# Patient Record
Sex: FEMALE
[De-identification: names, ages, dates, MRNs, and addresses within clinical notes are randomized per-mention and may not be internally consistent; named-entity substitution may affect disease eponyms.]

---

## 2022-06-21 ENCOUNTER — TELEPHONE (OUTPATIENT)
Dept: SCHEDULING | Age: 22
End: 2022-06-21

## 2023-06-12 ENCOUNTER — TELEPHONE (OUTPATIENT)
Dept: OTOLARYNGOLOGY | Age: 23
End: 2023-06-12

## 2023-06-16 ENCOUNTER — EXTERNAL RECORD (OUTPATIENT)
Dept: OTHER | Age: 23
End: 2023-06-16

## 2023-06-16 LAB
D-DIMER, QUANTITATIVE: 936 NG/MLFEU (ref 0–622)
LDH: 359 U/L (ref 140–271)

## 2023-06-17 ENCOUNTER — EXTERNAL RECORD (OUTPATIENT)
Dept: OTHER | Facility: PHYSICIAN GROUP | Age: 23
End: 2023-06-17

## 2023-06-17 LAB
*MEAN CORPUSCULAR HGB CONC: 33.3 G/DL (ref 32.5–35.8)
ANION GAP: 8 MEQ/L (ref 6.2–14.7)
BASOPHIL AUTOMATED: 0.6 %
BASOPHILS: 0 (ref 0–0.14)
BLOOD UREA NITROGEN (BUN): 19 MG/DL (ref 7–25)
BUN/CREATININE RATIO: 20.7 (ref 7.4–23)
CALCIUM, SERUM: 9 MG/DL (ref 8.6–10.3)
CARBON DIOXIDE: 24 MEQ/L (ref 21–31)
CHLORIDE, SERUM: 104 MMOL/L (ref 98–107)
CREATININE: 0.92 MG/DL (ref 0.6–1.2)
EOSINOPHIL AUTOMATED: 2.4 %
EOSINOPHILS: 0.1 (ref 0–0.6)
EST GLOMERULAR FILTRATION RATE: > 60 ML/MIN
GLUCOSE: 68 MG/DL (ref 70–99)
HEMATOCRIT: 39.4 % (ref 34.7–45.1)
HEMOGLOBIN: 13.1 GM/DL (ref 12–15.3)
K (POTASSIUM, SERUM): 4 MMOL/L (ref 3.5–5.2)
LYMPHOCYTE AUTOMATED: 48.5 %
LYMPHOCYTES: 1.2 (ref 0.78–3.73)
MEAN CORPUSCULAR HGB: 28.5 PG (ref 26–34)
MEAN CORPUSCULAR VOL: 85.5 FL (ref 80–100)
MEAN PLATELET VOLUME: 7.2 FL (ref 6.8–10.2)
MONOCYTE AUTOMATED: 8.3 %
MONOCYTES: 0.2 (ref 0.17–1)
NA (SODIUM, SERUM): 136 MMOL/L (ref 133–144)
NEUTROPHIL ABSOLUTE: 1 (ref 1.91–7.6)
NEUTROPHIL AUTOMATED: 40.2 %
PLATELET COUNT: 190 K/MM3 (ref 150–450)
RED BLOOD CELL COUNT: 4.61 M/MM3 (ref 3.63–5.04)
RED CELL DISTRIBUTIO: 12.6 % (ref 11.9–15.9)
WHITE BLOOD CELL COU: 2.4 K/MM3 (ref 4–11)

## 2023-06-17 PROCEDURE — 99254 IP/OBS CNSLTJ NEW/EST MOD 60: CPT | Performed by: OTOLARYNGOLOGY

## 2023-06-18 LAB
*MEAN CORPUSCULAR HGB CONC: 33.9 G/DL (ref 32.5–35.8)
ALBUMIN, SERUM (ALB): 3 G/DL (ref 3.5–5.7)
ANION GAP: 9 MEQ/L (ref 6.2–14.7)
BASOPHIL AUTOMATED: 0.6 %
BASOPHILS: 0 (ref 0–0.14)
BLOOD UREA NITROGEN (BUN): 15 MG/DL (ref 7–25)
BUN/CREATININE RATIO: 15.3 (ref 7.4–23)
CALCIUM ALBUMIN ADJUSTED: 9.3 MG/DL (ref 8.6–10.3)
CALCIUM, SERUM: 8.5 MG/DL (ref 8.6–10.3)
CARBON DIOXIDE: 23 MEQ/L (ref 21–31)
CHLORIDE, SERUM: 108 MMOL/L (ref 98–107)
CREATININE: 0.98 MG/DL (ref 0.6–1.2)
EOSINOPHIL AUTOMATED: 1.6 %
EOSINOPHILS: 0 (ref 0–0.6)
EST GLOMERULAR FILTRATION RATE: > 60 ML/MIN
GLUCOSE: 71 MG/DL (ref 70–99)
HEMATOCRIT: 34 % (ref 34.7–45.1)
HEMOGLOBIN: 11.5 GM/DL (ref 12–15.3)
K (POTASSIUM, SERUM): 4.1 MMOL/L (ref 3.5–5.2)
LYMPHOCYTE AUTOMATED: 45.6 %
LYMPHOCYTES: 1.4 (ref 0.78–3.73)
MEAN CORPUSCULAR HGB: 28.7 PG (ref 26–34)
MEAN CORPUSCULAR VOL: 84.9 FL (ref 80–100)
MEAN PLATELET VOLUME: 7.5 FL (ref 6.8–10.2)
MONOCYTE AUTOMATED: 8 %
MONOCYTES: 0.2 (ref 0.17–1)
NA (SODIUM, SERUM): 140 MMOL/L (ref 133–144)
NEUTROPHIL ABSOLUTE: 1.3 (ref 1.91–7.6)
NEUTROPHIL AUTOMATED: 44.2 %
PLATELET COUNT: 160 K/MM3 (ref 150–450)
RED BLOOD CELL COUNT: 4.01 M/MM3 (ref 3.63–5.04)
RED CELL DISTRIBUTIO: 12.4 % (ref 11.9–15.9)
WHITE BLOOD CELL COU: 3 K/MM3 (ref 4–11)

## 2023-06-18 PROCEDURE — 99231 SBSQ HOSP IP/OBS SF/LOW 25: CPT | Performed by: OTOLARYNGOLOGY

## 2023-06-19 ENCOUNTER — EXTERNAL LAB (OUTPATIENT)
Dept: OTHER | Facility: PHYSICIAN GROUP | Age: 23
End: 2023-06-19

## 2023-06-19 ENCOUNTER — EXTERNAL LAB (OUTPATIENT)
Dept: OTHER | Age: 23
End: 2023-06-19

## 2023-06-19 LAB
INTERNATIONAL NORMAL: 1 (ref 0.8–1.1)
LAB RESULT: NORMAL
PROTHROMBIN TIME (PATIENT): 12 SECONDS (ref 10.1–13.1)

## 2023-06-20 ENCOUNTER — EXTERNAL RECORD (OUTPATIENT)
Dept: OTHER | Age: 23
End: 2023-06-20

## 2023-06-20 LAB
*MEAN CORPUSCULAR HGB CONC: 34 G/DL (ref 32.5–35.8)
ANION GAP: 7 MEQ/L (ref 6.2–14.7)
BASOPHIL AUTOMATED: 0.6 %
BASOPHILS: 0 (ref 0–0.14)
BLOOD UREA NITROGEN (BUN): 13 MG/DL (ref 7–25)
BUN/CREATININE RATIO: 15.5 (ref 7.4–23)
CALCIUM, SERUM: 8.7 MG/DL (ref 8.6–10.3)
CARBON DIOXIDE: 24 MEQ/L (ref 21–31)
CHLORIDE, SERUM: 105 MMOL/L (ref 98–107)
CREATININE: 0.84 MG/DL (ref 0.6–1.2)
EOSINOPHIL AUTOMATED: 2.5 %
EOSINOPHILS: 0.1 (ref 0–0.6)
EST GLOMERULAR FILTRATION RATE: > 60 ML/MIN
GLUCOSE: 96 MG/DL (ref 70–99)
HEMATOCRIT: 33.3 % (ref 34.7–45.1)
HEMOGLOBIN: 11.3 GM/DL (ref 12–15.3)
K (POTASSIUM, SERUM): 3.7 MMOL/L (ref 3.5–5.2)
LYMPHOCYTE AUTOMATED: 46 %
LYMPHOCYTES: 1.2 (ref 0.78–3.73)
MEAN CORPUSCULAR HGB: 28.4 PG (ref 26–34)
MEAN CORPUSCULAR VOL: 83.4 FL (ref 80–100)
MEAN PLATELET VOLUME: 7.4 FL (ref 6.8–10.2)
MG (MAGNESIUM, SERUM: 1.7 MG/DL (ref 1.6–2.6)
MONOCYTE AUTOMATED: 7.2 %
MONOCYTES: 0.2 (ref 0.17–1)
NA (SODIUM, SERUM): 136 MMOL/L (ref 133–144)
NEUTROPHIL ABSOLUTE: 1.1 (ref 1.91–7.6)
NEUTROPHIL AUTOMATED: 43.7 %
PLATELET COUNT: 172 K/MM3 (ref 150–450)
RED BLOOD CELL COUNT: 4 M/MM3 (ref 3.63–5.04)
RED CELL DISTRIBUTIO: 12.5 % (ref 11.9–15.9)
WHITE BLOOD CELL COU: 2.6 K/MM3 (ref 4–11)

## 2023-06-26 ENCOUNTER — TELEPHONE (OUTPATIENT)
Dept: OTOLARYNGOLOGY | Age: 23
End: 2023-06-26

## 2023-06-26 LAB — CHROMOSOME ANAL., BONE MARROW: NORMAL

## 2023-07-12 ENCOUNTER — OFFICE VISIT (OUTPATIENT)
Dept: OTOLARYNGOLOGY | Age: 23
End: 2023-07-12

## 2023-07-12 VITALS — BODY MASS INDEX: 24.74 KG/M2 | WEIGHT: 126 LBS | HEIGHT: 60 IN

## 2023-07-12 DIAGNOSIS — K11.1 PAROTID GLAND ENLARGEMENT: ICD-10-CM

## 2023-07-12 DIAGNOSIS — R59.0 CERVICAL LYMPHADENOPATHY: Primary | ICD-10-CM

## 2023-07-12 PROBLEM — R74.8 ELEVATED LIVER ENZYMES: Status: ACTIVE | Noted: 2023-07-10

## 2023-07-12 PROBLEM — F98.8 ADD (ATTENTION DEFICIT DISORDER): Status: ACTIVE | Noted: 2023-07-12

## 2023-07-12 PROBLEM — D72.819 LEUKOPENIA: Status: ACTIVE | Noted: 2023-01-01

## 2023-07-12 PROCEDURE — 99214 OFFICE O/P EST MOD 30 MIN: CPT | Performed by: OTOLARYNGOLOGY

## 2023-07-12 RX ORDER — DEXTROAMPHETAMINE SACCHARATE, AMPHETAMINE ASPARTATE MONOHYDRATE, DEXTROAMPHETAMINE SULFATE AND AMPHETAMINE SULFATE 5; 5; 5; 5 MG/1; MG/1; MG/1; MG/1
CAPSULE, EXTENDED RELEASE ORAL
COMMUNITY
Start: 2023-05-12

## 2023-07-12 RX ORDER — LANOLIN ALCOHOL/MO/W.PET/CERES
3 CREAM (GRAM) TOPICAL DAILY
COMMUNITY

## 2023-07-20 ENCOUNTER — TELEPHONE (OUTPATIENT)
Dept: OTOLARYNGOLOGY | Age: 23
End: 2023-07-20

## 2024-02-16 ENCOUNTER — HOSPITAL ENCOUNTER (OUTPATIENT)
Age: 24
Discharge: HOME | End: 2024-02-16
Payer: COMMERCIAL

## 2024-02-16 VITALS
HEART RATE: 107 BPM | SYSTOLIC BLOOD PRESSURE: 110 MMHG | TEMPERATURE: 99 F | RESPIRATION RATE: 19 BRPM | DIASTOLIC BLOOD PRESSURE: 88 MMHG

## 2024-02-16 DIAGNOSIS — U07.1 COVID-19: Primary | ICD-10-CM

## 2024-02-16 LAB
POCT INFLUENZA A: NEGATIVE
POCT INFLUENZA B: NEGATIVE
S PYO AG THROAT QL IA.RAPID: NEGATIVE
SARS-COV-2 RNA RESP QL NAA+PROBE: DETECTED

## 2024-02-16 PROCEDURE — 87651 STREP A DNA AMP PROBE: CPT | Performed by: PHYSICIAN ASSISTANT

## 2024-02-16 PROCEDURE — 87502 INFLUENZA DNA AMP PROBE: CPT | Performed by: PHYSICIAN ASSISTANT

## 2024-02-16 PROCEDURE — 99203 OFFICE O/P NEW LOW 30 MIN: CPT

## 2024-02-16 PROCEDURE — 99202 OFFICE O/P NEW SF 15 MIN: CPT

## 2024-02-16 NOTE — ED PROVIDER NOTES
Chief Complaint   Patient presents with    Sore Throat           Ear Pain       HPI:     Beverley Samaniego is a 23 year old female who presents for evaluation of sore throat over the last 2 weeks.  [facility] with a negative strep screen, discharged home with recommendation for supportive measures.  Notes developed low-grade temperature overnight without preceding temperature, took Advil Cold and Sinus this morning around 7 AM, afebrile on arrival.  Denies sick contacts or exposures.  Denies history of recent antibiotic or previous mono.  Denies associated headache dizziness active ear pain dysphagia nasal congestion cough neck pain chest pain shortness of breath abdominal pain vomiting diarrhea dysuria or rash.      PFSH    PFSH asessment screens reviewed and agree.  Nurses notes reviewed I agree with documentation.    No family history on file.  Family history reviewed with patient/caregiver and is not pertinent to presenting problem.  Social History     Socioeconomic History    Marital status: Single     Spouse name: Not on file    Number of children: Not on file    Years of education: Not on file    Highest education level: Not on file   Occupational History    Not on file   Tobacco Use    Smoking status: Not on file    Smokeless tobacco: Not on file   Substance and Sexual Activity    Alcohol use: Not on file    Drug use: Not on file    Sexual activity: Not on file   Other Topics Concern    Not on file   Social History Narrative    Not on file     Social Determinants of Health     Financial Resource Strain: Not on file   Food Insecurity: Not on file   Transportation Needs: Not on file   Physical Activity: Not on file   Stress: Not on file   Social Connections: Not on file   Housing Stability: Not on file         ROS:   Positive for stated complaint: Sore throat.  Fever.  All other systems reviewed and negative except as noted above.  Constitutional and Vital Signs  Reviewed.      Physical Exam:     Findings:    /88   Pulse 107   Temp 99.1 °F (37.3 °C) (Temporal)   Resp 19   GENERAL: well developed, well nourished, well hydrated, no distress  SKIN: good skin turgor, no obvious rashes  NECK: No nuchal rigidity.  No palpable cervical lymphadenopathy.  Supple, no adenopathy  EXTREMITIES: no cyanosis or edema. MATTHEWS without difficulty  GI: No palpable organomegaly left upper quadrant.  Soft, non-tender, normal bowel sounds  HEAD: normocephalic, atraumatic  EYES: sclera non icteric bilateral, conjunctiva clear  EARS: TMs clear bilaterally. Canals clear.  NOSE: no rhinorrhea..  MMM.  Nasal turbinates: pink, normal mucosa  THROAT: Mild reactive changes along the posterior pharynx, nonenlarged tonsils., without exudates, uvula midline, and airway patent  LUNGS: clear to auscultation bilaterally; no rales, rhonchi, or wheezes  NEURO: No focal deficits  PSYCH: Alert and oriented x3.  Answering questions appropriately.  Mood appropriate.    MDM/Assessment/Plan:   Orders for this encounter:    Orders Placed This Encounter    POCT Flu Test     Order Specific Question:   Release to patient     Answer:   Immediate    Rapid Strep A - ID NOW     Order Specific Question:   Release to patient     Answer:   Immediate    Rapid SARS-CoV-2 by PCR     Order Specific Question:   Release to patient     Answer:   Immediate       Labs performed this visit:  Recent Results (from the past 10 hour(s))   POCT Flu Test    Collection Time: 02/16/24  1:15 PM    Specimen: Nares; Other   Result Value Ref Range    POCT INFLUENZA A Negative Negative    POCT INFLUENZA B Negative Negative   Rapid Strep A - ID NOW    Collection Time: 02/16/24  1:15 PM    Specimen: Throat; Other   Result Value Ref Range    Strep A by PCR Negative Negative   Rapid SARS-CoV-2 by PCR    Collection Time: 02/16/24  1:15 PM    Specimen: Nares; Other   Result Value Ref Range    Rapid SARS-CoV-2 by PCR Detected (A) Not Detected        MDM:  Patient coronavirus positive, strep and flu negative.  Discussed monoscreen based on history and extent of symptoms, patient declines blood draw states will readdress as outpatient.  Happy with plan of care, alert nontoxic-appearing.  Agrees with over-the-counter supportive agents including analgesics for pain and fever.  Agrees with no antiviral treatment based on history and examination.    Diagnosis:    ICD-10-CM    1. COVID-19  U07.1           All results reviewed and discussed with patient.  See AVS for detailed discharge instructions for your condition today.    Follow Up with:  Tanya Torrez  [address]  [address] FLO[address]  [facility] [address]  [phone]    Schedule an appointment as soon as possible for a visit in 3 days  As needed, If symptoms worsen

## 2024-02-16 NOTE — ED INITIAL ASSESSMENT (HPI)
Patient with sore throat and ear pain that started 2 weeks ago, at that time patient negative for strep throat   Patient taking sinus medication with some relief. Last night symptoms became worse and she developed fever of 101

## 2024-04-05 ENCOUNTER — HOSPITAL ENCOUNTER (OUTPATIENT)
Age: 24
Discharge: HOME | End: 2024-04-05
Attending: EMERGENCY MEDICINE
Payer: COMMERCIAL

## 2024-04-05 VITALS
TEMPERATURE: 99 F | DIASTOLIC BLOOD PRESSURE: 77 MMHG | HEART RATE: 99 BPM | SYSTOLIC BLOOD PRESSURE: 104 MMHG | OXYGEN SATURATION: 96 % | RESPIRATION RATE: 20 BRPM

## 2024-04-05 DIAGNOSIS — J03.90 ACUTE TONSILLITIS, UNSPECIFIED ETIOLOGY: Primary | ICD-10-CM

## 2024-04-05 LAB
POCT MONO: NEGATIVE
S PYO AG THROAT QL IA.RAPID: NEGATIVE

## 2024-04-05 PROCEDURE — 99213 OFFICE O/P EST LOW 20 MIN: CPT

## 2024-04-05 PROCEDURE — 86308 HETEROPHILE ANTIBODY SCREEN: CPT | Performed by: EMERGENCY MEDICINE

## 2024-04-05 PROCEDURE — 87651 STREP A DNA AMP PROBE: CPT | Performed by: EMERGENCY MEDICINE

## 2024-04-05 PROCEDURE — 36415 COLL VENOUS BLD VENIPUNCTURE: CPT

## 2024-04-05 RX ORDER — DEXTROAMPHETAMINE SACCHARATE, AMPHETAMINE ASPARTATE MONOHYDRATE, DEXTROAMPHETAMINE SULFATE AND AMPHETAMINE SULFATE 2.5; 2.5; 2.5; 2.5 MG/1; MG/1; MG/1; MG/1
CAPSULE, EXTENDED RELEASE ORAL
COMMUNITY
Start: 2021-09-20

## 2024-04-05 NOTE — ED PROVIDER NOTES
Patient Seen in: [facility]      History     Chief Complaint   Patient presents with    Tonsil Problem     Stated Complaint: swollen tonsils    Subjective:   HPI    Patient is a 23-year-old female with no significant past medical history who presents now with recurrent swollen tonsils.  The patient states that she had COVID approximately 6 weeks ago.  Since that time, the patient has had intermittent, but frequently recurring swollen tonsils.  Patient states that this has happened multiple times in the last 6 weeks.  The patient states she has had increased swelling of tonsils over the last several days.  Patient denies any fever.  Denies any abdominal pain    Objective:   History reviewed. No pertinent past medical history.           History reviewed. No pertinent surgical history.             No pertinent social history.            Review of Systems    Positive for stated complaint: swollen tonsils  Other systems are as noted in HPI.  Constitutional and vital signs reviewed.      All other systems reviewed and negative except as noted above.    Physical Exam     ED Triage Vitals [04/05/24 1228]   /77   Pulse 99   Resp 20   Temp 98.7 °F (37.1 °C)   Temp src Temporal   SpO2 96 %   O2 Device None (Room air)       Current:/77   Pulse 99   Temp 98.7 °F (37.1 °C) (Temporal)   Resp 20   SpO2 96%         Physical Exam    Constitutional: Well-developed well-nourished in no acute distress  Head: Normocephalic, no swelling or tenderness  Neck: No significant cervical adenopathy.  Eyes: Nonicteric sclera, no conjunctival injection  ENT: TMs are clear and flat bilaterally.  There is mild posterior pharyngeal erythema with moderate symmetric bilateral tonsillar hypertrophy.  The uvula is midline.  Chest: Clear to auscultation, no tenderness  Cardiovascular: Regular rate and rhythm without murmur  Abdomen: Soft, nontender and nondistended  Neurologic: Patient is awake, alert and oriented ×3.  The  patient's motor strength is 5 out of 5 and symmetric in the upper and lower extremities bilaterally  Extremities: No focal swelling or tenderness  Skin: No pallor, no redness or warmth to the touch      ED Course     Labs Reviewed   POCT MONO TEST - Normal   RAPID STREP A - Normal             Patient's negative strep, negative mono were reviewed with the patient.  Options regarding antibiotics and/or steroids were discussed.  The patient states she has been on multiple antibiotics which have not changed her symptomatology.  She declines any further antibiotics at this time.  Patient states she was on a steroid taper a few months ago that resulted in a diffuse rash.  The patient declined steroids.  Will provide with ENT follow-up.         MDM      Viral pharyngitis versus strep throat versus mononucleosis                                   Medical Decision Making      Disposition and Plan     Clinical Impression:  1. Acute tonsillitis, unspecified etiology         Disposition:  Discharge  4/5/2024 12:51 pm    Follow-up:  Sarha Caicedo MD  [address] [place] [id number]  [phone]      4-year ear nose and throat doctor          Medications Prescribed:  Discharge Medication List as of 4/5/2024 12:51 PM

## 2024-04-05 NOTE — ED INITIAL ASSESSMENT (HPI)
Patient reports hx of covid infection in February, since then reports increased size of her tonsils with symptoms worsening over the last few weeks.  Denies fevers, chills.  Reports occasional barky cough.  Reports multiple negative strep tests.

## 2024-04-09 ENCOUNTER — OFFICE VISIT (OUTPATIENT)
Dept: OTOLARYNGOLOGY | Facility: CLINIC | Age: 24
End: 2024-04-09

## 2024-04-09 VITALS — HEIGHT: 60 IN | WEIGHT: 115 LBS | TEMPERATURE: 98 F | BODY MASS INDEX: 22.58 KG/M2

## 2024-04-09 DIAGNOSIS — J03.90 TONSILLITIS: Primary | ICD-10-CM

## 2024-04-09 PROCEDURE — 99243 OFF/OP CNSLTJ NEW/EST LOW 30: CPT | Performed by: STUDENT IN AN ORGANIZED HEALTH CARE EDUCATION/TRAINING PROGRAM

## 2024-04-09 PROCEDURE — 3008F BODY MASS INDEX DOCD: CPT | Performed by: STUDENT IN AN ORGANIZED HEALTH CARE EDUCATION/TRAINING PROGRAM

## 2024-04-09 RX ORDER — MULTIVITAMIN
1 TABLET ORAL DAILY
COMMUNITY

## 2024-04-09 RX ORDER — CLINDAMYCIN HYDROCHLORIDE 300 MG/1
300 CAPSULE ORAL 3 TIMES DAILY
Qty: 21 CAPSULE | Refills: 0 | Status: SHIPPED | OUTPATIENT
Start: 2024-04-09 | End: 2024-04-16

## 2024-04-09 NOTE — PROGRESS NOTES
Beverley Samaniego is a 23 year old female.   Chief Complaint   Patient presents with    Tonsillitis     Recurrent tonsil infection, enlarged tonsil, orange/white patches on tongue , strep negative, Covid positive in February       Snoring       ASSESSMENT AND PLAN:   1. Tonsillitis  Is a 23-year-old who reports tonsillary pain and enlargement for about a week.  Was seen in the urgent care April for this.  She has not been on any antibiotics or steroids.  She states that she had a rash with a steroid last summer.  She has been on several rounds of antibiotics she states for a vaginal strep infection.    Exam she has erythematous and inflamed appearing tonsils on each side.  No signs of peritonsillar abscess    Does appear that she has an acute tonsillitis not currently on any treatment.  Has been going on for at least 4 to 5 days now and has not been improving.  We will begin clindamycin for 7 days.  Will avoid steroids due to previous side effects of a rash.  Currently no signs of peritonsillar abscess but discussed return precautions.  She can follow back up if still not improving. Consult from Dr Lennon regarding tonsillitis     The patient indicates understanding of these issues and agrees to the plan.      EXAM:   Temp 97.8 °F (36.6 °C) (Tympanic)   Ht 5' (1.524 m)   Wt 115 lb (52.2 kg)   BMI 22.46 kg/m²     Pertinent exam findings may also be noted above in assessment and plan     System Details   Skin Inspection - Normal.   Constitutional Overall appearance - Normal.   Head/Face Symmetric, TMJ tenderness not present    Eyes EOMI, PERRL   Right ear:  Canal clear, TM intact, no DANNA   Left ear:  Canal clear, TM intact, no DANNA   Nose: Septum midline, inferior turbinates not enlarged, nasal valves without collapse    Oral cavity/Oropharynx: No lesions or masses on inspection or palpation, tonsils symmetric    Neck: Soft without LAD, thyroid not enlarged  Voice clear/ no stridor   Other:      Scopes and Procedures:              Current Outpatient Medications   Medication Sig Dispense Refill    Multiple Vitamin (ONE-DAILY MULTI VITAMINS) Oral Tab Take 1 tablet by mouth daily.      clindamycin 300 MG Oral Cap Take 1 capsule (300 mg total) by mouth 3 (three) times daily for 7 days. 21 capsule 0    amphetamine-dextroamphetamine ER 10 MG Oral Capsule SR 24 Hr TAKE 1 CAPSULE BY MOUTH EVERY MORNING AS NEEDED FOR ADHD SYMPTOMS        History reviewed. No pertinent past medical history.   Social History:  Social History     Socioeconomic History    Marital status: Single          Andrae Quan MD  4/9/2024  6:10 PM

## 2024-05-28 ENCOUNTER — TELEPHONE (OUTPATIENT)
Dept: ORTHOPEDICS | Age: 24
End: 2024-05-28

## 2024-06-06 ENCOUNTER — TELEPHONE (OUTPATIENT)
Dept: SPORTS MEDICINE | Age: 24
End: 2024-06-06

## 2024-06-06 ENCOUNTER — IMAGING SERVICES (OUTPATIENT)
Dept: GENERAL RADIOLOGY | Age: 24
End: 2024-06-06
Attending: FAMILY MEDICINE

## 2024-06-06 ENCOUNTER — APPOINTMENT (OUTPATIENT)
Dept: SPORTS MEDICINE | Age: 24
End: 2024-06-06

## 2024-06-06 VITALS
DIASTOLIC BLOOD PRESSURE: 77 MMHG | WEIGHT: 113 LBS | BODY MASS INDEX: 22.19 KG/M2 | HEIGHT: 60 IN | HEART RATE: 81 BPM | SYSTOLIC BLOOD PRESSURE: 109 MMHG

## 2024-06-06 DIAGNOSIS — S63.501A SPRAIN OF RIGHT WRIST, INITIAL ENCOUNTER: Primary | ICD-10-CM

## 2024-06-06 DIAGNOSIS — M25.531 RIGHT WRIST PAIN: ICD-10-CM

## 2024-06-06 RX ORDER — MELOXICAM 7.5 MG/1
7.5 TABLET ORAL
Qty: 30 TABLET | Refills: 0 | Status: SHIPPED | OUTPATIENT
Start: 2024-06-06 | End: 2024-07-06

## 2024-06-06 ASSESSMENT — ENCOUNTER SYMPTOMS
NERVOUS/ANXIOUS: 1
CHOKING: 0
ACTIVITY CHANGE: 0
SEIZURES: 1
CHILLS: 0
FATIGUE: 0
DIARRHEA: 0
EYE PAIN: 0
WHEEZING: 0
SHORTNESS OF BREATH: 0
RHINORRHEA: 0
BLOOD IN STOOL: 0
PHOTOPHOBIA: 0
EYE REDNESS: 0
FEVER: 0
UNEXPECTED WEIGHT CHANGE: 0
SPEECH DIFFICULTY: 0
NAUSEA: 0
NUMBNESS: 0
BACK PAIN: 0
SORE THROAT: 0
ABDOMINAL PAIN: 0
SLEEP DISTURBANCE: 0
CHEST TIGHTNESS: 0
SINUS PRESSURE: 0
COUGH: 0
TROUBLE SWALLOWING: 0
DIAPHORESIS: 0
CONSTIPATION: 0
SINUS PAIN: 0
AGITATION: 0
HEADACHES: 0
DIZZINESS: 0

## 2024-06-11 ENCOUNTER — HOSPITAL ENCOUNTER (OUTPATIENT)
Dept: PHYSICAL MEDICINE AND REHAB | Age: 24
Discharge: STILL ADMITTED OR ADMINISTRATIVE | End: 2024-06-11
Attending: FAMILY MEDICINE

## 2024-06-11 DIAGNOSIS — M25.531 RIGHT WRIST PAIN: Primary | ICD-10-CM

## 2024-06-11 PROCEDURE — 97110 THERAPEUTIC EXERCISES: CPT

## 2024-06-11 PROCEDURE — 97165 OT EVAL LOW COMPLEX 30 MIN: CPT

## 2024-06-11 ASSESSMENT — ENCOUNTER SYMPTOMS
ALLEVIATING FACTORS: REST
QUALITY: SHARP
ALLEVIATING FACTORS: PRESCRIPTION MEDICATIONS
QUALITY: TINGLING
QUALITY: ACHE
PAIN SCALE AT LOWEST: 0
PAIN FREQUENCY: INTERMITTENT
ALLEVIATING FACTORS: AVOIDING MOVEMENT IN INVOLVED AREA
PAIN SEVERITY NOW: 4
PAIN SCALE AT HIGHEST: 8

## 2024-06-17 ENCOUNTER — APPOINTMENT (OUTPATIENT)
Dept: PHYSICAL MEDICINE AND REHAB | Age: 24
End: 2024-06-17

## 2024-06-24 ENCOUNTER — HOSPITAL ENCOUNTER (OUTPATIENT)
Dept: PHYSICAL MEDICINE AND REHAB | Age: 24
Discharge: STILL ADMITTED OR ADMINISTRATIVE | End: 2024-06-24

## 2024-06-24 DIAGNOSIS — M25.531 RIGHT WRIST PAIN: Primary | ICD-10-CM

## 2024-06-24 PROCEDURE — 97110 THERAPEUTIC EXERCISES: CPT

## 2024-06-24 PROCEDURE — 97530 THERAPEUTIC ACTIVITIES: CPT

## 2024-06-28 ASSESSMENT — ENCOUNTER SYMPTOMS
FATIGUE: 0
AGITATION: 0
DIARRHEA: 0
EYE PAIN: 0
BACK PAIN: 0
SORE THROAT: 0
LIGHT-HEADEDNESS: 0
SHORTNESS OF BREATH: 0
CHILLS: 0
DIZZINESS: 0
NAUSEA: 0
NUMBNESS: 0
ABDOMINAL PAIN: 0
WHEEZING: 0
CHEST TIGHTNESS: 0
HEADACHES: 0
WOUND: 0
NERVOUS/ANXIOUS: 0
CONSTIPATION: 0
FEVER: 0
SLEEP DISTURBANCE: 0
WEAKNESS: 0
VOMITING: 0
TROUBLE SWALLOWING: 0

## 2024-07-05 ENCOUNTER — APPOINTMENT (OUTPATIENT)
Dept: SPORTS MEDICINE | Age: 24
End: 2024-07-05

## 2024-07-05 DIAGNOSIS — S63.501A SPRAIN OF RIGHT WRIST, INITIAL ENCOUNTER: Primary | ICD-10-CM

## 2024-07-05 DIAGNOSIS — M25.531 RIGHT WRIST PAIN: ICD-10-CM

## 2024-07-07 ENCOUNTER — TELEPHONE (OUTPATIENT)
Dept: ORTHOPEDICS | Age: 24
End: 2024-07-07

## 2024-07-11 ENCOUNTER — IMAGING SERVICES (OUTPATIENT)
Dept: GENERAL RADIOLOGY | Age: 24
End: 2024-07-11

## 2024-07-11 DIAGNOSIS — Z02.1 NEED FOR HISTORY AND PHYSICAL EXAMINATION FOR EMPLOYMENT: ICD-10-CM

## 2024-07-11 PROCEDURE — 71045 X-RAY EXAM CHEST 1 VIEW: CPT | Performed by: PREVENTIVE MEDICINE

## 2024-07-12 ENCOUNTER — TELEPHONE (OUTPATIENT)
Dept: SPORTS MEDICINE | Age: 24
End: 2024-07-12

## (undated) NOTE — LETTER
Date & Time: 2/16/2024, 1:50 PM  Patient: Beverley Samaniego  Encounter Provider(s):    Raul Amaro PA       To Whom It May Concern:    Beverley Samaniego was seen and treated in our department on 2/16/2024. She SHOULD NOT RETURN TO WORK OR SCHOOL UNTIL TUESDAY WITH MASKING THROUGH THE FOLLOWING WEEKEND.    If you have any questions or concerns, please do not hesitate to call.      RAUL AMARO   _____________________________  Physician/AP[facility] Signature